# Patient Record
Sex: FEMALE | URBAN - METROPOLITAN AREA
[De-identification: names, ages, dates, MRNs, and addresses within clinical notes are randomized per-mention and may not be internally consistent; named-entity substitution may affect disease eponyms.]

---
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
## 2019-08-05 ENCOUNTER — TELEPHONE (OUTPATIENT)
Dept: OPHTHALMOLOGY | Facility: CLINIC | Age: 19
End: 2019-08-05

## 2019-08-05 NOTE — TELEPHONE ENCOUNTER
[facility] Call Center    Phone Message    May a detailed message be left on voicemail: yes    Reason for Call: Other: pt was prescribed colored contacts that are sunglasses, she was told there were 3 different shades, the ones pt has are too dark and pt's mother is wondering if she canjust order lighter ones without having to come in for an appt or not, please call pt's mother to discuss further please call     Action Taken: Message routed to:  [facility] Eye

## 2019-08-06 ENCOUNTER — TELEPHONE (OUTPATIENT)
Dept: OPHTHALMOLOGY | Facility: CLINIC | Age: 19
End: 2019-08-06

## 2019-08-06 NOTE — TELEPHONE ENCOUNTER
The patient's mother called wondering if her contact lens prescription can be adjusted to be less dark.  I left a message stating that the prescription expires in 11 days and I would recommend seeing her for a follow-up.

## 2019-09-10 ENCOUNTER — DOCUMENTATION ONLY (OUTPATIENT)
Dept: CARE COORDINATION | Facility: CLINIC | Age: 19
End: 2019-09-10

## 2019-11-03 ENCOUNTER — HEALTH MAINTENANCE LETTER (OUTPATIENT)
Age: 19
End: 2019-11-03

## 2019-11-07 ENCOUNTER — OFFICE VISIT (OUTPATIENT)
Dept: OPHTHALMOLOGY | Facility: CLINIC | Age: 19
End: 2019-11-07
Payer: COMMERCIAL

## 2019-11-07 ENCOUNTER — OFFICE VISIT (OUTPATIENT)
Dept: OPTOMETRY | Facility: CLINIC | Age: 19
End: 2019-11-07
Payer: COMMERCIAL

## 2019-11-07 DIAGNOSIS — H52.13 MYOPIA OF BOTH EYES WITH ASTIGMATISM: ICD-10-CM

## 2019-11-07 DIAGNOSIS — H53.149 PHOTOPHOBIA: ICD-10-CM

## 2019-11-07 DIAGNOSIS — H53.149 PHOTOPHOBIA: Primary | ICD-10-CM

## 2019-11-07 DIAGNOSIS — H52.203 MYOPIA OF BOTH EYES WITH ASTIGMATISM: ICD-10-CM

## 2019-11-07 DIAGNOSIS — H53.143 VISUAL DISCOMFORT, BILATERAL: Primary | ICD-10-CM

## 2019-11-07 RX ORDER — TOPIRAMATE SPINKLE 15 MG/1
CAPSULE ORAL
COMMUNITY
Start: 2019-09-18

## 2019-11-07 ASSESSMENT — CUP TO DISC RATIO
OS_RATIO: 0.3
OD_RATIO: 0.3

## 2019-11-07 ASSESSMENT — REFRACTION_CURRENTRX
OS_BASECURVE: 8.6
OD_DIAMETER: 14.0
OD_BASECURVE: 8.4
OS_BASECURVE: 8.6
OD_SPHERE: PLANO
OD_ADDL_SPECS: 11.5MM TINTED ZONE
OS_ADDL_SPECS: 11.5MM TINTED ZONE
OD_ADDL_SPECS: 11.5MM TINTED ZONE
OD_BASECURVE: 8.6
OD_DIAMETER: 14.5
OD_BRAND: ALDEN TINTED LENS
OS_SPHERE: PLANO
OS_DIAMETER: 14.0
OD_BRAND: ALDEN TINTED LENS
OS_SPHERE: PLANO
OS_BASECURVE: 8.4
OS_SPHERE: PLANO
OS_ADDL_SPECS: 11.5MM TINTED ZONE
OS_DIAMETER: 14.5
OD_SPHERE: PLANO
OD_SPHERE: PLANO
OS_DIAMETER: 14.5
OD_BASECURVE: 8.6
OD_DIAMETER: 14.5
OS_BRAND: ALDEN TINTED LENS
OS_BRAND: ALDEN TINTED LENS

## 2019-11-07 ASSESSMENT — VISUAL ACUITY
OS_SC: 20/30
OD_SC: 20/60
METHOD: SNELLEN - LINEAR
OS_SC+: -2
OD_SC+: -2

## 2019-11-07 ASSESSMENT — REFRACTION_MANIFEST
OS_SPHERE: -2.00
OS_AXIS: 075
OS_CYLINDER: +1.75
OD_CYLINDER: +1.75
OD_AXIS: 100
OD_SPHERE: -2.50

## 2019-11-07 ASSESSMENT — TONOMETRY
OD_IOP_MMHG: 20
IOP_METHOD: ICARE
OS_IOP_MMHG: 19

## 2019-11-07 ASSESSMENT — CONF VISUAL FIELD
METHOD: COUNTING FINGERS
OS_NORMAL: 1
OD_NORMAL: 1

## 2019-11-07 ASSESSMENT — SLIT LAMP EXAM - LIDS
COMMENTS: NORMAL
COMMENTS: NORMAL

## 2019-11-07 ASSESSMENT — EXTERNAL EXAM - LEFT EYE: OS_EXAM: NORMAL

## 2019-11-07 ASSESSMENT — EXTERNAL EXAM - RIGHT EYE: OD_EXAM: NORMAL

## 2019-11-07 NOTE — NURSING NOTE
Chief Complaints and History of Present Illnesses   Patient presents with     Annual Eye Exam     Tint lenses     Chief Complaint(s) and History of Present Illness(es)     Annual Eye Exam     Laterality: both eyes    Onset: gradual    Onset: years ago    Frequency: constantly    Course: stable    Associated symptoms: Negative for eye pain    Treatments tried: no treatments    Pain scale: 0/10    Comments: Tint lenses              Comments     Tint too dark for computer or TV use, but working for outside. Difficult to go from outside to inside. Trying to go outside more. Patient states vision has been stable since last eye exam, both eyes. Denies eye pain or irritation. Does not take eye drops.      Carito Shaffer COT 1:35 PM November 7, 2019

## 2019-11-07 NOTE — PROGRESS NOTES
A/P  1.) Photophobia each eye  -Longstanding, no significant change over past several years  -[place] tint helped, but last tint was too dark. Discussed option with pt, they would like to try lightest [place] tint  -If Wildwood still too dark we can try brown Clifford tint too. They would prefer to keep plano and wear glasses over  -Can also trial AV Oasys Transitions lenses too  -Dilated ocular health unremarkable each eye    Order lenses, mail to pt. Okay to continue if working well    I have confirmed the patient's CC, HPI and reviewed Past Medical History, Past Surgical History, Social History, Family History, Problem List, Medication List and agree with Tech note.     Dianna Iglesias, OD FAAO FSLS

## 2019-11-07 NOTE — PROGRESS NOTES
No office visit. CL order only.    Contact Lens Billing  V-Code:  - CL, other  Final Contact Lens Rx       Brand Base Curve Diameter Sphere Lens Addl. Specs    Right [place] Tinted Lens 8.6 14.5 [place] #1 11.5mm tinted zone    Left [place] Tinted Lens 8.6 14.5 [place] #1 11.5mm tinted zone         # of units: 2  Price per Unit: $75    This patient requires contact lenses that are medically necessary for either improvement in vision over spectacles, support of the ocular surface, or other therapeutic benefit. These are not cosmetic contact lenses.     Encounter Diagnosis   Name Primary?     Photophobia Yes

## 2020-02-10 ENCOUNTER — HEALTH MAINTENANCE LETTER (OUTPATIENT)
Age: 20
End: 2020-02-10

## 2020-11-16 ENCOUNTER — HEALTH MAINTENANCE LETTER (OUTPATIENT)
Age: 20
End: 2020-11-16

## 2021-09-18 ENCOUNTER — HEALTH MAINTENANCE LETTER (OUTPATIENT)
Age: 21
End: 2021-09-18

## 2021-11-16 NOTE — NURSING NOTE
Chief Complaints and History of Present Illnesses   Patient presents with     New Patient     HPI    Affected eye(s):  Both   Symptoms:     No blurred vision   No decreased vision   Photophobia         Do you have eye pain now?:  No      Comments:  New patient who has light sensitivity and headaches.    KASIA Jane 2:00 PM 07/13/2017               Glycopyrrolate Pregnancy And Lactation Text: This medication is Pregnancy Category B and is considered safe during pregnancy. It is unknown if it is excreted breast milk.

## 2022-01-08 ENCOUNTER — HEALTH MAINTENANCE LETTER (OUTPATIENT)
Age: 22
End: 2022-01-08

## 2022-11-19 ENCOUNTER — HEALTH MAINTENANCE LETTER (OUTPATIENT)
Age: 22
End: 2022-11-19

## 2023-04-15 ENCOUNTER — HEALTH MAINTENANCE LETTER (OUTPATIENT)
Age: 23
End: 2023-04-15